# Patient Record
Sex: FEMALE | Race: WHITE | Employment: UNEMPLOYED | URBAN - METROPOLITAN AREA
[De-identification: names, ages, dates, MRNs, and addresses within clinical notes are randomized per-mention and may not be internally consistent; named-entity substitution may affect disease eponyms.]

---

## 2017-10-17 ENCOUNTER — APPOINTMENT (OUTPATIENT)
Dept: CT IMAGING | Facility: HOSPITAL | Age: 47
End: 2017-10-17
Attending: EMERGENCY MEDICINE
Payer: MEDICARE

## 2017-10-17 ENCOUNTER — HOSPITAL ENCOUNTER (EMERGENCY)
Facility: HOSPITAL | Age: 47
Discharge: HOME | End: 2017-10-17
Attending: EMERGENCY MEDICINE
Payer: MEDICARE

## 2017-10-17 ENCOUNTER — APPOINTMENT (OUTPATIENT)
Dept: GENERAL RADIOLOGY | Facility: HOSPITAL | Age: 47
End: 2017-10-17
Payer: MEDICARE

## 2017-10-17 VITALS
BODY MASS INDEX: 32.1 KG/M2 | HEART RATE: 74 BPM | OXYGEN SATURATION: 98 % | RESPIRATION RATE: 14 BRPM | SYSTOLIC BLOOD PRESSURE: 105 MMHG | DIASTOLIC BLOOD PRESSURE: 60 MMHG | WEIGHT: 170 LBS | TEMPERATURE: 98 F | HEIGHT: 61 IN

## 2017-10-17 DIAGNOSIS — R07.9 ACUTE CHEST PAIN: Primary | ICD-10-CM

## 2017-10-17 PROCEDURE — 84484 ASSAY OF TROPONIN QUANT: CPT | Performed by: EMERGENCY MEDICINE

## 2017-10-17 PROCEDURE — 85025 COMPLETE CBC W/AUTO DIFF WBC: CPT

## 2017-10-17 PROCEDURE — 84484 ASSAY OF TROPONIN QUANT: CPT

## 2017-10-17 PROCEDURE — 99285 EMERGENCY DEPT VISIT HI MDM: CPT

## 2017-10-17 PROCEDURE — 71010 XR CHEST AP PORTABLE  (CPT=71010): CPT | Performed by: EMERGENCY MEDICINE

## 2017-10-17 PROCEDURE — 93005 ELECTROCARDIOGRAM TRACING: CPT

## 2017-10-17 PROCEDURE — 93010 ELECTROCARDIOGRAM REPORT: CPT

## 2017-10-17 PROCEDURE — 85378 FIBRIN DEGRADE SEMIQUANT: CPT | Performed by: EMERGENCY MEDICINE

## 2017-10-17 PROCEDURE — 36415 COLL VENOUS BLD VENIPUNCTURE: CPT

## 2017-10-17 PROCEDURE — 80053 COMPREHEN METABOLIC PANEL: CPT

## 2017-10-17 PROCEDURE — 85025 COMPLETE CBC W/AUTO DIFF WBC: CPT | Performed by: EMERGENCY MEDICINE

## 2017-10-17 PROCEDURE — 71275 CT ANGIOGRAPHY CHEST: CPT | Performed by: EMERGENCY MEDICINE

## 2017-10-17 PROCEDURE — 80053 COMPREHEN METABOLIC PANEL: CPT | Performed by: EMERGENCY MEDICINE

## 2017-10-17 NOTE — ED INITIAL ASSESSMENT (HPI)
Pt presents with intermittent left sided cp and left arm pain since 1400. Pt states she did take 2 325mg asa apx 30 min ago  She had also taken Klonopin & muscle relaxer around 1445.

## 2017-10-18 NOTE — ED PROVIDER NOTES
Patient Seen in: [facility] Emergency Department    History   Patient presents with:  Chest Pain Angina (cardiovascular)    Stated Complaint: cp    HPI    Patient presents with chest pain. The patient states that she had a very stressful day today. other systems reviewed and negative except as noted above. PSFH elements reviewed from today and agreed except as otherwise stated in HPI.     Physical Exam   ED Triage Vitals  BP: 120/71 [10/17/17 1728]  Pulse: 81 [10/17/17 1725]  Resp: 19 [10/17/17 172 negative predictive value  for venous thromboembolism when the D-Dimer is greater than 0.50 ug/mL (FEU). Proceed with caution from clinical presentation.    CBC W/ DIFFERENTIAL - Abnormal; Notable for the following:     Monocyte Absolute 0.68 (*)     Al enlargement, pericardial thickening, or significant calcification. PLEURA:  No mass or effusion. CHEST WALL:  No mass or axillary adenopathy. LIMITED ABDOMEN:  Limited images of the upper abdomen demonstrate mild fatty infiltration of the liver.  BONES: 10/17/2017  8:20 PM

## 2017-10-18 NOTE — ED NOTES
Pt report received from [place], [place]. Pt back from Ct, resting abed w/ no distress noted. Warm blanket provided. VSS.  Skin w/p/d

## 2018-06-17 ENCOUNTER — APPOINTMENT (OUTPATIENT)
Dept: GENERAL RADIOLOGY | Facility: HOSPITAL | Age: 48
End: 2018-06-17
Attending: EMERGENCY MEDICINE
Payer: MEDICARE

## 2018-06-17 ENCOUNTER — APPOINTMENT (OUTPATIENT)
Dept: CT IMAGING | Facility: HOSPITAL | Age: 48
End: 2018-06-17
Attending: EMERGENCY MEDICINE
Payer: MEDICARE

## 2018-06-17 ENCOUNTER — HOSPITAL ENCOUNTER (OUTPATIENT)
Facility: HOSPITAL | Age: 48
Setting detail: OBSERVATION
Discharge: HOME | End: 2018-06-18
Attending: EMERGENCY MEDICINE | Admitting: INTERNAL MEDICINE
Payer: MEDICARE

## 2018-06-17 DIAGNOSIS — F32.A DEPRESSION, UNSPECIFIED DEPRESSION TYPE: ICD-10-CM

## 2018-06-17 DIAGNOSIS — R40.4 ALTERED AWARENESS, TRANSIENT: Primary | ICD-10-CM

## 2018-06-17 LAB
ACETAMINOPHEN: <2 UG/ML (ref ?–2)
ALBUMIN SERPL-MCNC: 3.6 G/DL (ref 3.5–4.8)
ALP LIVER SERPL-CCNC: 94 U/L (ref 39–100)
ALT SERPL-CCNC: 24 U/L (ref 14–54)
AST SERPL-CCNC: 16 U/L (ref 15–41)
BASOPHILS # BLD AUTO: 0.02 X10(3) UL (ref 0–0.1)
BASOPHILS NFR BLD AUTO: 0.3 %
BILIRUB SERPL-MCNC: 0.2 MG/DL (ref 0.1–2)
BILIRUB UR QL STRIP.AUTO: NEGATIVE
BUN BLD-MCNC: 8 MG/DL (ref 8–20)
CALCIUM BLD-MCNC: 8.9 MG/DL (ref 8.3–10.3)
CHLORIDE: 115 MMOL/L (ref 101–111)
CO2: 22 MMOL/L (ref 22–32)
COLOR UR AUTO: YELLOW
CREAT BLD-MCNC: 1.15 MG/DL (ref 0.55–1.02)
EOSINOPHIL # BLD AUTO: 0.16 X10(3) UL (ref 0–0.3)
EOSINOPHIL NFR BLD AUTO: 2.8 %
ERYTHROCYTE [DISTWIDTH] IN BLOOD BY AUTOMATED COUNT: 14.8 % (ref 11.5–16)
ETHYL ALCOHOL: <3 MG/DL (ref ?–3)
GLUCOSE BLD-MCNC: 99 MG/DL (ref 70–99)
GLUCOSE UR STRIP.AUTO-MCNC: NEGATIVE MG/DL
HCT VFR BLD AUTO: 39.8 % (ref 34–50)
HGB BLD-MCNC: 12.9 G/DL (ref 12–16)
HYALINE CASTS #/AREA URNS AUTO: PRESENT /LPF
IMMATURE GRANULOCYTE COUNT: 0.01 X10(3) UL (ref 0–1)
IMMATURE GRANULOCYTE RATIO %: 0.2 %
LEUKOCYTE ESTERASE UR QL STRIP.AUTO: NEGATIVE
LYMPHOCYTES # BLD AUTO: 1.24 X10(3) UL (ref 0.9–4)
LYMPHOCYTES NFR BLD AUTO: 21.5 %
M PROTEIN MFR SERPL ELPH: 7 G/DL (ref 6.1–8.3)
MCH RBC QN AUTO: 28.9 PG (ref 27–33.2)
MCHC RBC AUTO-ENTMCNC: 32.4 G/DL (ref 31–37)
MCV RBC AUTO: 89.2 FL (ref 81–100)
MONOCYTES # BLD AUTO: 0.47 X10(3) UL (ref 0.1–1)
MONOCYTES NFR BLD AUTO: 8.1 %
NEUTROPHIL ABS PRELIM: 3.88 X10 (3) UL (ref 1.3–6.7)
NEUTROPHILS # BLD AUTO: 3.88 X10(3) UL (ref 1.3–6.7)
NEUTROPHILS NFR BLD AUTO: 67.1 %
NITRITE UR QL STRIP.AUTO: NEGATIVE
PH UR STRIP.AUTO: 5 [PH] (ref 4.5–8)
PLATELET # BLD AUTO: 303 10(3)UL (ref 150–450)
POCT LOT NUMBER: NORMAL
POCT URINE PREGNANCY: NEGATIVE
POTASSIUM SERPL-SCNC: 4.1 MMOL/L (ref 3.6–5.1)
PROT UR STRIP.AUTO-MCNC: NEGATIVE MG/DL
RBC # BLD AUTO: 4.46 X10(6)UL (ref 3.8–5.1)
RBC UR QL AUTO: NEGATIVE
RED CELL DISTRIBUTION WIDTH-SD: 48.1 FL (ref 35.1–46.3)
SALICYLATE: <1.7 MG/DL (ref ?–1.7)
SODIUM SERPL-SCNC: 145 MMOL/L (ref 136–144)
SP GR UR STRIP.AUTO: 1.02 (ref 1–1.03)
UROBILINOGEN UR STRIP.AUTO-MCNC: <2 MG/DL
WBC # BLD AUTO: 5.8 X10(3) UL (ref 4–13)

## 2018-06-17 PROCEDURE — 96361 HYDRATE IV INFUSION ADD-ON: CPT

## 2018-06-17 PROCEDURE — 99285 EMERGENCY DEPT VISIT HI MDM: CPT

## 2018-06-17 PROCEDURE — 80329 ANALGESICS NON-OPIOID 1 OR 2: CPT | Performed by: EMERGENCY MEDICINE

## 2018-06-17 PROCEDURE — 81025 URINE PREGNANCY TEST: CPT

## 2018-06-17 PROCEDURE — 85025 COMPLETE CBC W/AUTO DIFF WBC: CPT | Performed by: EMERGENCY MEDICINE

## 2018-06-17 PROCEDURE — 93010 ELECTROCARDIOGRAM REPORT: CPT

## 2018-06-17 PROCEDURE — 81003 URINALYSIS AUTO W/O SCOPE: CPT | Performed by: EMERGENCY MEDICINE

## 2018-06-17 PROCEDURE — 93005 ELECTROCARDIOGRAM TRACING: CPT

## 2018-06-17 PROCEDURE — 80307 DRUG TEST PRSMV CHEM ANLYZR: CPT | Performed by: INTERNAL MEDICINE

## 2018-06-17 PROCEDURE — 80320 DRUG SCREEN QUANTALCOHOLS: CPT | Performed by: EMERGENCY MEDICINE

## 2018-06-17 PROCEDURE — 80053 COMPREHEN METABOLIC PANEL: CPT | Performed by: EMERGENCY MEDICINE

## 2018-06-17 PROCEDURE — 70450 CT HEAD/BRAIN W/O DYE: CPT | Performed by: EMERGENCY MEDICINE

## 2018-06-17 PROCEDURE — 71045 X-RAY EXAM CHEST 1 VIEW: CPT | Performed by: EMERGENCY MEDICINE

## 2018-06-17 PROCEDURE — 96360 HYDRATION IV INFUSION INIT: CPT

## 2018-06-17 RX ORDER — SODIUM CHLORIDE 9 MG/ML
125 INJECTION, SOLUTION INTRAVENOUS CONTINUOUS
Status: DISCONTINUED | OUTPATIENT
Start: 2018-06-17 | End: 2018-06-18

## 2018-06-17 RX ORDER — ALBUTEROL SULFATE 90 UG/1
2 AEROSOL, METERED RESPIRATORY (INHALATION) EVERY 6 HOURS PRN
Status: DISCONTINUED | OUTPATIENT
Start: 2018-06-17 | End: 2018-06-18

## 2018-06-17 RX ORDER — LAMOTRIGINE 100 MG/1
200 TABLET ORAL 2 TIMES DAILY
Status: DISCONTINUED | OUTPATIENT
Start: 2018-06-17 | End: 2018-06-18

## 2018-06-17 RX ORDER — ALBUTEROL SULFATE 90 UG/1
1-2 AEROSOL, METERED RESPIRATORY (INHALATION) EVERY 6 HOURS PRN
Status: DISCONTINUED | OUTPATIENT
Start: 2018-06-17 | End: 2018-06-17 | Stop reason: SDUPTHER

## 2018-06-17 RX ORDER — SODIUM CHLORIDE 9 MG/ML
INJECTION, SOLUTION INTRAVENOUS CONTINUOUS
Status: ACTIVE | OUTPATIENT
Start: 2018-06-17 | End: 2018-06-17

## 2018-06-17 RX ORDER — ALBUTEROL SULFATE 90 UG/1
1 AEROSOL, METERED RESPIRATORY (INHALATION) EVERY 6 HOURS PRN
Status: DISCONTINUED | OUTPATIENT
Start: 2018-06-17 | End: 2018-06-18

## 2018-06-17 RX ORDER — ASENAPINE 10 MG/1
10 TABLET SUBLINGUAL DAILY
Status: DISCONTINUED | OUTPATIENT
Start: 2018-06-17 | End: 2018-06-18

## 2018-06-17 NOTE — ED INITIAL ASSESSMENT (HPI)
Pt w/ hx of Bipolar. Having migraines on and off x 1.5 years. Pt and family moving the last week. Pt altered and  reports being \"catatonic\". Had to lift pt up and pt did not speak. Pt does not know date, time, nor place at this time. Pt tearful.

## 2018-06-17 NOTE — BH LEVEL OF CARE ASSESSMENT
Level of Care Assessment Note    General Questions  Why are you here?: Pt arrived to the ED via . Pt states she is here in the hospital because her  brought her.  Pt says she does not know why her  brought her to the hospital.   Maikol Glaeson of information for CSSR: Patient  In what setting is the screener performed?: in person  1. Have you wished you were dead or wished you could go to sleep and not wake up? (past 30 days): No  2.  Have you actually had any thoughts of killing yourself? (past when her last panic attack was. Trauma Reaction:  (Pt denies)  Bipolar Symptoms:  (Pt reports Hx of Bipolar. Pt became upset about being in hospital and refused to answer questions.  Pt  states that the Pt has been doing well and has not been havin a supportive place for recovery?: Yes  Describe: Pt family is very supportive and Pt denies any substance abuse.       Withdrawal Symptoms  History of Withdrawal Symptoms: Denies past symptoms  Last Withdrawal Episode: Pt denies  Current Withdrawal Symptoms Due to confusion  Judgment: Poor  Fair/poor judgment as evidenced by: Due to confusion  Thought Patterns  Clarity/Relevance: Incoherent  Flow: Guarded  Content: Ordinary  Level of Consciousness: Drowsy  Level of Consciousness: Drowsy  Behavior  Exhibited b

## 2018-06-17 NOTE — PROGRESS NOTES
NURSING ADMISSION NOTE      Patient admitted via Cart  Oriented to room. Safety precautions initiated. Bed in low position. Call light in reach. Admission navigator completed.    MD paged for orders

## 2018-06-17 NOTE — H&P
[facility] Hospitalist History and Physical      CC: AMS    PCP: Joselyn Padilla DO    History of Present Illness: Patient is a 52year old female with PMH sig fo migraines and bipolar.    noted lethargic/confused this AM- was normal when went to bed last 1-2 qhs while on medrol, prn after that Disp: 60 tablet Rfl: 6   hydrocodone-acetaminophen 7.5-325 MG Oral Tab Take 1 tablet by mouth every 6 (six) hours as needed for Pain.  Disp: 30 tablet Rfl: 0   Hydrocodone-Acetaminophen (VICODIN ES) 7.5-300 MG Oral Ta (Oral)   Resp 16   Ht 5' 5\" (1.651 m)   Wt 165 lb (74.8 kg)   LMP 05/27/2018   SpO2 100%   BMI 27.46 kg/m²     Gen: No acute distress, sleepy but arousable  Pulm: Lungs clear bilaterally, normal respiratory effort  CV: Heart with regular rate and rhythm,

## 2018-06-17 NOTE — PROGRESS NOTES
Pt is a/o x4. Drowsy, but arousable. Confusion seems to be resolving. Room air. NSR on tele. Neurology consulted for AMS, confusion. Will see pt in AM. IV fluids. Regular diet. Family updated on POC.  Pt and  told that pharmacy does not carry saphris

## 2018-06-17 NOTE — ED PROVIDER NOTES
Patient Seen in: [facility] Emergency Department    History   Patient presents with:  Altered Mental Status (neurologic)  Eval-P (psychiatric)  Fatigue (constitutional, neurologic)    Stated Complaint: migraine    HPI    This is a 71-year-old female Systems    Positive for stated complaint: migraine  Other systems are as noted in HPI. Constitutional and vital signs reviewed. All other systems reviewed and negative except as noted above.     Physical Exam   ED Triage Vitals [06/17/18 1302]  BP: 12 Ketones Urine Trace (*)     Bacteria Urine 1+ (*)     Squamous Epi.  Cells Large (*)     Hyaline Casts Present (*)     Mucous Urine 1+ (*)     All other components within normal limits   CBC W/ DIFFERENTIAL - Abnormal; Notable for the following:     RDW-SD transcribed by Technologist)  Yessica, patient catatonic   FINDINGS: No evidence of hemorrhage or extra-axial fluid collection. Supra and infratentorial brain parenchyma are unremarkable. Ventricles and sulci are appropriate for the patient's age.   [facility]. Disposition and Plan     Clinical Impression:  Altered awareness, transient  (primary encounter diagnosis)  Depression, unspecified depression type    Disposition:  There is no disposition on file for this visit.   There is

## 2018-06-18 ENCOUNTER — APPOINTMENT (OUTPATIENT)
Dept: MRI IMAGING | Facility: HOSPITAL | Age: 48
End: 2018-06-18
Attending: Other
Payer: MEDICARE

## 2018-06-18 VITALS
WEIGHT: 165 LBS | DIASTOLIC BLOOD PRESSURE: 66 MMHG | OXYGEN SATURATION: 98 % | RESPIRATION RATE: 16 BRPM | BODY MASS INDEX: 27.49 KG/M2 | HEART RATE: 93 BPM | HEIGHT: 65 IN | TEMPERATURE: 99 F | SYSTOLIC BLOOD PRESSURE: 116 MMHG

## 2018-06-18 LAB
AMPHETAMINE URINE: NEGATIVE
BARBITURATES URINE: NEGATIVE
BENZODIAZEPINES URINE: NEGATIVE
BUN BLD-MCNC: 7 MG/DL (ref 8–20)
CALCIUM BLD-MCNC: 7.9 MG/DL (ref 8.3–10.3)
CANNABINOID URINE: NEGATIVE
CHLORIDE: 120 MMOL/L (ref 101–111)
CO2: 19 MMOL/L (ref 22–32)
COCAINE URINE: NEGATIVE
CREAT BLD-MCNC: 0.84 MG/DL (ref 0.55–1.02)
DEPRECATED HBV CORE AB SER IA-ACNC: 11 NG/ML (ref 12–240)
ETHYL ALCOHOL, QUALITATIVE: NEGATIVE
GLUCOSE BLD-MCNC: 88 MG/DL (ref 70–99)
HAV AB SERPL IA-ACNC: 502 PG/ML (ref 193–986)
PCP URINE: NEGATIVE
POTASSIUM SERPL-SCNC: 4 MMOL/L (ref 3.6–5.1)
SODIUM SERPL-SCNC: 146 MMOL/L (ref 136–144)
TSI SER-ACNC: 0.78 MIU/ML (ref 0.35–5.5)

## 2018-06-18 PROCEDURE — 82607 VITAMIN B-12: CPT | Performed by: OTHER

## 2018-06-18 PROCEDURE — 96361 HYDRATE IV INFUSION ADD-ON: CPT

## 2018-06-18 PROCEDURE — 80048 BASIC METABOLIC PNL TOTAL CA: CPT | Performed by: HOSPITALIST

## 2018-06-18 PROCEDURE — 84443 ASSAY THYROID STIM HORMONE: CPT | Performed by: OTHER

## 2018-06-18 PROCEDURE — 70551 MRI BRAIN STEM W/O DYE: CPT | Performed by: OTHER

## 2018-06-18 PROCEDURE — 82728 ASSAY OF FERRITIN: CPT | Performed by: OTHER

## 2018-06-18 PROCEDURE — 95816 EEG AWAKE AND DROWSY: CPT

## 2018-06-18 RX ORDER — LORAZEPAM 2 MG/ML
0.5 INJECTION INTRAMUSCULAR EVERY 10 MIN PRN
Status: DISCONTINUED | OUTPATIENT
Start: 2018-06-18 | End: 2018-06-18

## 2018-06-18 RX ORDER — ENOXAPARIN SODIUM 100 MG/ML
40 INJECTION SUBCUTANEOUS NIGHTLY
Status: DISCONTINUED | OUTPATIENT
Start: 2018-06-18 | End: 2018-06-18

## 2018-06-18 NOTE — PROCEDURES
[facility] [address]  [place], [address] [address]      PATIENT'S NAME: Leopold Coffin   ATTENDING PHYSICIAN: Christopher Cole. Carlitos Scott M.D.    PATIENT ACCOUNT #: [de-identified] LOCATION: [address] [place] [address]   MEDICAL RECORD #: [id number] DATE Haylie Reynolds 14:19:09  t: 06/18/2018 14:23:07  [facility] [id number]  JA/

## 2018-06-18 NOTE — PLAN OF CARE
AOx4  VSS, on RA  Tele- NSR  EEG ordered per Neuro, results pending  IVF- 0.9 @ 125 ml/hr  Up ad alva    NEUROLOGICAL - ADULT    • Achieves stable or improved neurological status Progressing    • Achieves maximal functionality and self care Progressing

## 2018-06-18 NOTE — PAYOR COMM NOTE
--------------  ADMISSION REVIEW     Payor: [employer] #:  [id number]  Authorization Number: N/A    Admit date: N/A  Admit time: N/A       Admitting Physician: Danna Carl MD  Attending Physician:  Danna Carl MD*  Antonio LIGATION  1998: UNLISTED PROC, LAPAROSCOPY, ABDOMEN, PERITONEU*      Comment: X 2; to removed adhesions        Review of Systems    Positive for stated complaint: migraine  Other systems are as noted in HPI. Constitutional and vital signs reviewed.       A within normal limits   URINALYSIS WITH CULTURE REFLEX - Abnormal; Notable for the following:     Clarity Urine Hazy (*)     Ketones Urine Trace (*)     Bacteria Urine 1+ (*)     Squamous Epi.  Cells Large (*)     Hyaline Casts Present (*)     Mucous Urine 1 Radiology) NRDR ([address]) which includes the Dose Index Registry. PATIENT STATED HISTORY: (As transcribed by Technologist)  Migarines, patient catatonic   FINDINGS: No evidence of hemorrhage or extra-axial fluid collection.   Supra Medication List                Signed by Lyn Duncan MD on 6/17/2018  2:52 PM     ED Provider Notes signed by Lyn Duncan MD at 6/17/2018  3:37 PM     Author:  Lyn Duncan MD Service:  (none) Author Type:  Physician    Filed:  6/17/2018 Vision intact. No numbness or weakness arm/legs. Speech clear. Denies drugs/ETOH. Has been feeling stressed about recent move. Not eating well.  not sure shes taking bipolar meds lately- saphris and lamictal. She says she is.       Past Medi (FINACEA) 15 % Apply Externally Gel Apply to face HS Disp: 50 g Rfl: 1   Desonide 0.05 % Apply Externally Cream Apply to face AM Disp: 60 g Rfl: 1   lamoTRIgine (LAMICTAL) 25 MG Oral Tab Take 100 mg by mouth daily.  PRN Disp:  Rfl:    EPINEPHrine (EPIPEN 2- reviewed.     Radiology:    I independently reviewed the following studies from admit date:     CXR: clear  CT Head: no acute abnl      Assessment/Plan:   Principal Problem:    Altered awareness, transient      # AMS  - Unclear is related to HA, took pain m

## 2018-06-18 NOTE — PROGRESS NOTES
[facility] Hospitalist Progress Note     PCP: Kathi Earl DO    Chief Complaint: follow-up    Overnight/Interim Events:      SUBJECTIVE:  Pt laying in bed, feeling much better than yesterday.  Denies missing any doses of meds    OBJECTIVE:  Temp:  [97.8 °F to HA, took pain meds/benzos? Has several norco scripts UDS pending  - Related to missing meds for bipolar?   - Also has rx for flexeril, klonopin- HOLD given lethargy  - No focal deficits on exam  - CT head OK  -  feels she is returning to baseline,

## 2018-06-18 NOTE — PLAN OF CARE
Assumed pt care at 1500. Aa/ox4. Per pt and spouse back to baseline, no complains. Breathing unlabored. Up ad alva. patient and spouse stating that they want to leave and will not wait for the MRI to be done, that that can be done as outpt.  I updated

## 2018-06-18 NOTE — CONSULTS
[address]    PATIENT'S NAME: GINA, [address] [facility] PHYSICIAN: Grabiel Jin. Maddi Ward M.D.   [facility]: Rosa Vivar M.D.    PATIENT ACCOUNT#:   [de-identified]    LOCATION:  [address] [place] [address]  MEDICAL RECORD #:   [id number]       DATE ligation. MEDICATIONS:  Home medications:  Hydrocodone, temazepam, Lamictal, clonazepam, Saphris, Flexeril, cyclobenzaprine, Relpax, multivitamin, and omega-3. ALLERGIES:  Mold and penicillin. SOCIAL HISTORY:  Nonsmoker.   Alcohol, 2 to 3 drinks pe

## 2018-06-20 LAB
ATRIAL RATE: 62 BPM
P AXIS: 21 DEGREES
P-R INTERVAL: 134 MS
Q-T INTERVAL: 408 MS
QRS DURATION: 76 MS
QTC CALCULATION (BEZET): 414 MS
R AXIS: 29 DEGREES
T AXIS: 6 DEGREES
VENTRICULAR RATE: 62 BPM

## 2018-07-11 NOTE — ED PROVIDER NOTES
The patient was still somewhat confused on repeat exam.  But she knows what year it is no focal neurological deficits is able to ambulate.   This may be a medication reaction could be the fact that she missed her doses but she was still somewhat confused an surgery

## 2019-07-08 ENCOUNTER — OFFICE VISIT (OUTPATIENT)
Dept: FAMILY MEDICINE CLINIC | Facility: CLINIC | Age: 49
End: 2019-07-08
Payer: MEDICARE

## 2019-07-08 VITALS
HEART RATE: 71 BPM | WEIGHT: 145 LBS | SYSTOLIC BLOOD PRESSURE: 100 MMHG | DIASTOLIC BLOOD PRESSURE: 60 MMHG | BODY MASS INDEX: 27 KG/M2 | TEMPERATURE: 98 F | OXYGEN SATURATION: 97 %

## 2019-07-08 DIAGNOSIS — H10.31 ACUTE BACTERIAL CONJUNCTIVITIS OF RIGHT EYE: Primary | ICD-10-CM

## 2019-07-08 PROCEDURE — 99202 OFFICE O/P NEW SF 15 MIN: CPT | Performed by: FAMILY MEDICINE

## 2019-07-08 RX ORDER — TOBRAMYCIN 3 MG/ML
2 SOLUTION/ DROPS OPHTHALMIC EVERY 4 HOURS
Qty: 5 ML | Refills: 0 | Status: SHIPPED | OUTPATIENT
Start: 2019-07-08 | End: 2019-07-15

## 2019-07-08 NOTE — PROGRESS NOTES
CHIEF COMPLAINT:   Patient presents with:  Eye Problem: pink eye x 2 d-- right eye      HPI:   Karri Mireles is a 50year old female who presents with chief complaint of \"pink eye\". Symptoms began  2  days ago.  Symptoms have been persistent since onse Extreme Allergy to Mold   • Bipolar affective ([facility] [address]    • Migraine    • Migraines    • Obesity, unspecified       Past Surgical History:   Procedure Laterality Date   • ANESTH, SECTION  [id number]    X 2   •       x2   • D & C      po with:    ASSESSMENT:   Acute bacterial conjunctivitis of right eye  (primary encounter diagnosis)    PLAN: Hygeine and comfort care as listen in patient instructions.   Medication as listed below     Requested Prescriptions     Signed Prescriptions Disp Ref

## 2019-07-16 PROCEDURE — 87086 URINE CULTURE/COLONY COUNT: CPT | Performed by: FAMILY MEDICINE

## 2019-08-31 PROBLEM — F41.9 ANXIETY: Status: ACTIVE | Noted: 2019-08-31

## 2022-05-06 PROBLEM — F31.70 BIPOLAR DISORDER IN PARTIAL REMISSION (HCC): Status: ACTIVE | Noted: 2021-04-06

## 2022-05-06 PROBLEM — G43.909 MIGRAINE: Status: ACTIVE | Noted: 2021-04-06

## 2022-05-06 PROBLEM — E66.3 OVERWEIGHT: Status: ACTIVE | Noted: 2021-04-06

## 2022-05-06 PROBLEM — H52.10 MYOPIA: Status: ACTIVE | Noted: 2020-06-22

## 2022-05-25 ENCOUNTER — OFFICE VISIT (OUTPATIENT)
Dept: OBGYN CLINIC | Age: 52
End: 2022-05-25
Payer: MEDICARE

## 2022-05-25 VITALS
BODY MASS INDEX: 32.44 KG/M2 | SYSTOLIC BLOOD PRESSURE: 94 MMHG | WEIGHT: 171.8 LBS | HEIGHT: 61 IN | DIASTOLIC BLOOD PRESSURE: 62 MMHG

## 2022-05-25 DIAGNOSIS — N95.0 PMB (POSTMENOPAUSAL BLEEDING): Primary | ICD-10-CM

## 2022-05-25 DIAGNOSIS — R35.0 URINARY FREQUENCY: ICD-10-CM

## 2022-05-25 DIAGNOSIS — R30.0 DYSURIA: ICD-10-CM

## 2022-05-25 DIAGNOSIS — N95.0 PMB (POSTMENOPAUSAL BLEEDING): ICD-10-CM

## 2022-05-25 LAB
BILIRUBIN, URINE, POC: NEGATIVE
BLOOD URINE, POC: NEGATIVE
GLUCOSE URINE, POC: NEGATIVE
KETONES, URINE, POC: NEGATIVE
LEUKOCYTE ESTERASE, URINE, POC: NEGATIVE
NITRITE, URINE, POC: NEGATIVE
PH, URINE, POC: 5 (ref 4.6–8)
PROTEIN,URINE, POC: NEGATIVE
SPECIFIC GRAVITY, URINE, POC: 1.01 (ref 1–1.03)
URINALYSIS CLARITY, POC: CLEAR
URINALYSIS COLOR, POC: YELLOW
UROBILINOGEN, POC: NORMAL

## 2022-05-25 PROCEDURE — G8417 CALC BMI ABV UP PARAM F/U: HCPCS | Performed by: OBSTETRICS & GYNECOLOGY

## 2022-05-25 PROCEDURE — 81002 URINALYSIS NONAUTO W/O SCOPE: CPT | Performed by: OBSTETRICS & GYNECOLOGY

## 2022-05-25 PROCEDURE — 1036F TOBACCO NON-USER: CPT | Performed by: OBSTETRICS & GYNECOLOGY

## 2022-05-25 PROCEDURE — 3017F COLORECTAL CA SCREEN DOC REV: CPT | Performed by: OBSTETRICS & GYNECOLOGY

## 2022-05-25 PROCEDURE — G8427 DOCREV CUR MEDS BY ELIG CLIN: HCPCS | Performed by: OBSTETRICS & GYNECOLOGY

## 2022-05-25 PROCEDURE — 76830 TRANSVAGINAL US NON-OB: CPT | Performed by: OBSTETRICS & GYNECOLOGY

## 2022-05-25 PROCEDURE — 99214 OFFICE O/P EST MOD 30 MIN: CPT | Performed by: OBSTETRICS & GYNECOLOGY

## 2022-05-25 RX ORDER — LAMOTRIGINE 200 MG/1
200 TABLET, ORALLY DISINTEGRATING ORAL DAILY
COMMUNITY

## 2022-05-25 RX ORDER — LAMOTRIGINE 100 MG/1
TABLET ORAL
COMMUNITY
Start: 2022-05-19 | End: 2022-05-25 | Stop reason: DRUGHIGH

## 2022-05-25 ASSESSMENT — PATIENT HEALTH QUESTIONNAIRE - PHQ9
7. TROUBLE CONCENTRATING ON THINGS, SUCH AS READING THE NEWSPAPER OR WATCHING TELEVISION: 0
3. TROUBLE FALLING OR STAYING ASLEEP: 0
SUM OF ALL RESPONSES TO PHQ QUESTIONS 1-9: 0
SUM OF ALL RESPONSES TO PHQ QUESTIONS 1-9: 0
1. LITTLE INTEREST OR PLEASURE IN DOING THINGS: 0
SUM OF ALL RESPONSES TO PHQ QUESTIONS 1-9: 0
9. THOUGHTS THAT YOU WOULD BE BETTER OFF DEAD, OR OF HURTING YOURSELF: 0
SUM OF ALL RESPONSES TO PHQ QUESTIONS 1-9: 0
6. FEELING BAD ABOUT YOURSELF - OR THAT YOU ARE A FAILURE OR HAVE LET YOURSELF OR YOUR FAMILY DOWN: 0
SUM OF ALL RESPONSES TO PHQ QUESTIONS 1-9: 0
2. FEELING DOWN, DEPRESSED OR HOPELESS: 0
5. POOR APPETITE OR OVEREATING: 0
8. MOVING OR SPEAKING SO SLOWLY THAT OTHER PEOPLE COULD HAVE NOTICED. OR THE OPPOSITE, BEING SO FIGETY OR RESTLESS THAT YOU HAVE BEEN MOVING AROUND A LOT MORE THAN USUAL: 0
SUM OF ALL RESPONSES TO PHQ9 QUESTIONS 1 & 2: 0
2. FEELING DOWN, DEPRESSED OR HOPELESS: 0
SUM OF ALL RESPONSES TO PHQ QUESTIONS 1-9: 0
4. FEELING TIRED OR HAVING LITTLE ENERGY: 0
SUM OF ALL RESPONSES TO PHQ9 QUESTIONS 1 & 2: 0
1. LITTLE INTEREST OR PLEASURE IN DOING THINGS: 0

## 2022-05-25 NOTE — PROGRESS NOTES
Heather Aldana  is a 46 y.o. female, No obstetric history on file., No LMP recorded. Patient is postmenopausal.,  who is seen for an ultrasound for PMB. She just finished Bactrium for UTI yesterday. She did not have a urine culture. She states that she is still having sx - dysuria, urinary frequency, pressure. HISTORY:  Sexual History:  has sex with males  Contraception:  post menopausal status  Current Outpatient Medications on File Prior to Visit   Medication Sig Dispense Refill    lamoTRIgine 200 MG TBDP Take 200 mg by mouth daily       No current facility-administered medications on file prior to visit. ROS:  [unfilled]      Heather Aldana  has a past medical history of Bipolar 1 disorder ([facility] [address] .    Previous surgeries include  has a past surgical history that includes Dilation and curettage of uterus; pelvic laparoscopy;  section; and lap,tubal cautery. .    Her current meds are   Current Outpatient Medications:     lamoTRIgine 200 MG TBDP, Take 200 mg by mouth daily, Disp: , Rfl:      Family history is significant for family history includes Bipolar Disorder in her father; Dementia in her mother; Diabetes in her paternal grandfather; Pancreatic Cancer in her maternal grandmother. .       PHYSICAL EXAM:  Height 5' 1\" (1.549 m). OBGyn Exam   The patient appears well, alert, oriented x 3, in no distress. Ultrasound overall is normal.  Endometrial stripe is slightly thickened at approximately 8 mm. Please see the complete report under imaging    ASSESSMENT:  Encounter Diagnosis   Name Primary?  PMB (postmenopausal bleeding) Yes       PLAN:  With thickened endometrium do recommend that we do a endometrial biopsy and is discussed with her in detail and the rationale was also discussed. We will also send her urine off for culture due to the recent UTI and still having some symptoms.   Orders Placed This Encounter   Procedures    US NON OB TRANSVAGINAL     Order Specific Question:   Reason for exam: Answer:   PMB     ENDOMETRIAL BIOPSY    Date:  1970    Name:  Vamsi Toscano was inserted. Cervix visualized and cleansed with betadine. Single-tooth tenaculum Was applied to anterior lip. Curette was inserted through OS. Uterus sounded to 6 cm. Tissue obtained was small in amount. Patient tolerated procedure well.         Darrian Benjamin MD, MD

## 2022-05-28 LAB
BACTERIA SPEC CULT: NORMAL
SERVICE CMNT-IMP: NORMAL

## 2022-05-31 ENCOUNTER — TELEPHONE (OUTPATIENT)
Dept: OBGYN CLINIC | Age: 52
End: 2022-05-31

## 2022-05-31 NOTE — TELEPHONE ENCOUNTER
I called the pt and she was made aware of the normal labs. Pt asked if there needs to be a follow-up on her postmenopausal bleeding? Per Dr. Justus Cummings if the pt has no further bleeding recheck in 3 months; if bleeding continues need to follow up sooner. Pt voiced understanding. Plan to follow up in 3 months because at this time she is not having bleeding. All questions were answered.

## (undated) NOTE — ED AVS SNAPSHOT
Allison Ambrocio   MRN: [id number]    Department:  [facility] Emergency Department   Date of Visit:  10/17/2017           Disclosure     Insurance plans vary and the physician(s) referred by the ER may not be covered by your plan.  Please contact y If you have been prescribed any medication(s), please fill your prescription right away and begin taking the medication(s) as directed    If the emergency physician has read X-rays, these will be re-interpreted by a radiologist.  If there is a significant